# Patient Record
Sex: FEMALE | Race: WHITE | Employment: FULL TIME | URBAN - METROPOLITAN AREA
[De-identification: names, ages, dates, MRNs, and addresses within clinical notes are randomized per-mention and may not be internally consistent; named-entity substitution may affect disease eponyms.]

---

## 2018-06-07 ENCOUNTER — OFFICE VISIT (OUTPATIENT)
Dept: FAMILY MEDICINE CLINIC | Facility: CLINIC | Age: 23
End: 2018-06-07

## 2018-06-07 VITALS
HEART RATE: 102 BPM | HEIGHT: 64 IN | WEIGHT: 155 LBS | RESPIRATION RATE: 20 BRPM | OXYGEN SATURATION: 98 % | BODY MASS INDEX: 26.46 KG/M2 | DIASTOLIC BLOOD PRESSURE: 62 MMHG | SYSTOLIC BLOOD PRESSURE: 100 MMHG | TEMPERATURE: 99 F

## 2018-06-07 DIAGNOSIS — J03.90 TONSILLITIS: Primary | ICD-10-CM

## 2018-06-07 DIAGNOSIS — J02.9 SORE THROAT: ICD-10-CM

## 2018-06-07 PROCEDURE — 99202 OFFICE O/P NEW SF 15 MIN: CPT | Performed by: NURSE PRACTITIONER

## 2018-06-07 PROCEDURE — 87880 STREP A ASSAY W/OPTIC: CPT | Performed by: NURSE PRACTITIONER

## 2018-06-07 RX ORDER — AZITHROMYCIN 250 MG/1
TABLET, FILM COATED ORAL
Qty: 6 TABLET | Refills: 0 | Status: SHIPPED | OUTPATIENT
Start: 2018-06-07

## 2018-06-07 NOTE — PATIENT INSTRUCTIONS
Self-Care for Sore Throats    Sore throats happen for many reasons, such as colds, allergies, and infections caused by viruses or bacteria. In any case, your throat becomes red and sore.  Your goal for self-care is to reduce your discomfort while giving y Contact your healthcare provider if you have:  · A temperature over 101°F (38.3°C)  · White spots on the throat  · Great difficulty swallowing  · Trouble breathing  · A skin rash  · Recent exposure to someone else with strep bacteria  · Severe hoarseness a · Offer cool liquids to drink. · Have your child gargle with warm salt water. An over-the-counter throat-numbing spray may also help. The germs that cause tonsillitis are very contagious.  To help prevent their spread, follow these tips:  · Teach your chi

## 2018-06-07 NOTE — PROGRESS NOTES
CHIEF COMPLAINT:   Patient presents with:  Sore throatI: headache, sore throat x 2 days    HPI:   Batsheva Cardenas is a 20year old female presents to clinic with complaint of sore throat. Patient has had for 2 days.  Patient reports following assoc LUNGS: clear to auscultation bilaterally; no wheezes, rales, or rhonchi. Breathing is non labored.   CARDIO: RRR without murmur  GI: good BS's,no masses, hepatosplenomegaly, or tenderness on direct palpation  EXTREMITIES: no cyanosis, clubbing or edema  LYM Sore throats happen for many reasons, such as colds, allergies, and infections caused by viruses or bacteria. In any case, your throat becomes red and sore.  Your goal for self-care is to reduce your discomfort while giving your throat a chance to heal.  Mo · A temperature over 101°F (38.3°C)  · White spots on the throat  · Great difficulty swallowing  · Trouble breathing  · A skin rash  · Recent exposure to someone else with strep bacteria  · Severe hoarseness and swollen glands in the neck or jaw   [place] · Have your child gargle with warm salt water. An over-the-counter throat-numbing spray may also help. The germs that cause tonsillitis are very contagious.  To help prevent their spread, follow these tips:  · Teach your child to wash his or her hands ofte